# Patient Record
Sex: FEMALE | Race: WHITE | NOT HISPANIC OR LATINO | URBAN - METROPOLITAN AREA
[De-identification: names, ages, dates, MRNs, and addresses within clinical notes are randomized per-mention and may not be internally consistent; named-entity substitution may affect disease eponyms.]

---

## 2017-06-07 ENCOUNTER — APPOINTMENT (RX ONLY)
Dept: RURAL CLINIC 4 | Facility: CLINIC | Age: 62
Setting detail: DERMATOLOGY
End: 2017-06-07

## 2017-06-07 DIAGNOSIS — L57.0 ACTINIC KERATOSIS: ICD-10-CM

## 2017-06-07 DIAGNOSIS — L57.8 OTHER SKIN CHANGES DUE TO CHRONIC EXPOSURE TO NONIONIZING RADIATION: ICD-10-CM

## 2017-06-07 DIAGNOSIS — D22 MELANOCYTIC NEVI: ICD-10-CM

## 2017-06-07 DIAGNOSIS — L82.1 OTHER SEBORRHEIC KERATOSIS: ICD-10-CM

## 2017-06-07 DIAGNOSIS — D18.0 HEMANGIOMA: ICD-10-CM

## 2017-06-07 PROBLEM — D22.5 MELANOCYTIC NEVI OF TRUNK: Status: ACTIVE | Noted: 2017-06-07

## 2017-06-07 PROBLEM — D18.01 HEMANGIOMA OF SKIN AND SUBCUTANEOUS TISSUE: Status: ACTIVE | Noted: 2017-06-07

## 2017-06-07 PROCEDURE — ? COUNSELING

## 2017-06-07 PROCEDURE — 17003 DESTRUCT PREMALG LES 2-14: CPT

## 2017-06-07 PROCEDURE — 17000 DESTRUCT PREMALG LESION: CPT

## 2017-06-07 PROCEDURE — ? LIQUID NITROGEN

## 2017-06-07 PROCEDURE — 99214 OFFICE O/P EST MOD 30 MIN: CPT | Mod: 25

## 2017-06-07 PROCEDURE — ? PRESCRIPTION

## 2017-06-07 PROCEDURE — ? TREATMENT REGIMEN

## 2017-06-07 RX ORDER — TRETINOIN 0.25 MG/G
CREAM TOPICAL
Qty: 1 | Refills: 6 | Status: ERX | COMMUNITY
Start: 2017-06-07

## 2017-06-07 RX ADMIN — TRETINOIN TUBE: 0.25 CREAM TOPICAL at 00:00

## 2017-06-07 ASSESSMENT — LOCATION DETAILED DESCRIPTION DERM
LOCATION DETAILED: LEFT LATERAL ABDOMEN
LOCATION DETAILED: RIGHT ORAL COMMISSURE
LOCATION DETAILED: EPIGASTRIC SKIN
LOCATION DETAILED: RIGHT INFERIOR MEDIAL FOREHEAD
LOCATION DETAILED: LEFT ORAL COMMISSURE
LOCATION DETAILED: PERIUMBILICAL SKIN

## 2017-06-07 ASSESSMENT — LOCATION ZONE DERM
LOCATION ZONE: LIP
LOCATION ZONE: TRUNK
LOCATION ZONE: FACE

## 2017-06-07 ASSESSMENT — LOCATION SIMPLE DESCRIPTION DERM
LOCATION SIMPLE: LEFT LIP
LOCATION SIMPLE: RIGHT FOREHEAD
LOCATION SIMPLE: RIGHT LIP
LOCATION SIMPLE: ABDOMEN

## 2017-08-23 NOTE — PROCEDURE: MIPS QUALITY
Quality 111:Pneumonia Vaccination Status For Older Adults: Pneumococcal Vaccination Previously Received
Quality 110: Preventive Care And Screening: Influenza Immunization: Influenza Immunization previously received during influenza season
Detail Level: Detailed
Quality 226: Preventive Care And Screening: Tobacco Use: Screening And Cessation Intervention: Patient screened for tobacco and never smoked
Detail Level: Detailed
Quality 131: Pain Assessment And Follow-Up: Pain assessment using a standardized tool is documented as negative, no follow-up plan required
Quality 47: Advance Care Plan: Advance Care Planning discussed and documented in the medical record; patient did not wish or was not able to name a surrogate decision maker or provide an advance care plan.
Quality 130: Documentation Of Current Medications In The Medical Record: Current Medications Documented

## 2018-02-21 ENCOUNTER — APPOINTMENT (RX ONLY)
Dept: RURAL CLINIC 4 | Facility: CLINIC | Age: 63
Setting detail: DERMATOLOGY
End: 2018-02-21

## 2018-02-21 DIAGNOSIS — D22 MELANOCYTIC NEVI: ICD-10-CM

## 2018-02-21 DIAGNOSIS — L57.0 ACTINIC KERATOSIS: ICD-10-CM

## 2018-02-21 DIAGNOSIS — L57.8 OTHER SKIN CHANGES DUE TO CHRONIC EXPOSURE TO NONIONIZING RADIATION: ICD-10-CM

## 2018-02-21 DIAGNOSIS — L82.1 OTHER SEBORRHEIC KERATOSIS: ICD-10-CM

## 2018-02-21 DIAGNOSIS — D18.0 HEMANGIOMA: ICD-10-CM

## 2018-02-21 PROBLEM — D22.5 MELANOCYTIC NEVI OF TRUNK: Status: ACTIVE | Noted: 2018-02-21

## 2018-02-21 PROBLEM — D18.01 HEMANGIOMA OF SKIN AND SUBCUTANEOUS TISSUE: Status: ACTIVE | Noted: 2018-02-21

## 2018-02-21 PROCEDURE — ? LIQUID NITROGEN

## 2018-02-21 PROCEDURE — ? COUNSELING

## 2018-02-21 PROCEDURE — 99214 OFFICE O/P EST MOD 30 MIN: CPT | Mod: 25

## 2018-02-21 PROCEDURE — 17000 DESTRUCT PREMALG LESION: CPT

## 2018-02-21 ASSESSMENT — LOCATION DETAILED DESCRIPTION DERM
LOCATION DETAILED: EPIGASTRIC SKIN
LOCATION DETAILED: RIGHT LATERAL SUPERIOR CHEST
LOCATION DETAILED: RIGHT INFERIOR MEDIAL FOREHEAD
LOCATION DETAILED: LEFT LATERAL ABDOMEN
LOCATION DETAILED: LEFT INFERIOR CENTRAL MALAR CHEEK
LOCATION DETAILED: PERIUMBILICAL SKIN
LOCATION DETAILED: RIGHT MEDIAL SUPERIOR CHEST

## 2018-02-21 ASSESSMENT — LOCATION SIMPLE DESCRIPTION DERM
LOCATION SIMPLE: RIGHT FOREHEAD
LOCATION SIMPLE: ABDOMEN
LOCATION SIMPLE: LEFT CHEEK
LOCATION SIMPLE: CHEST
LOCATION SIMPLE: CHEST

## 2018-02-21 ASSESSMENT — LOCATION ZONE DERM
LOCATION ZONE: FACE
LOCATION ZONE: TRUNK
LOCATION ZONE: TRUNK

## 2018-02-21 NOTE — PROCEDURE: LIQUID NITROGEN
Number Of Freeze-Thaw Cycles: 2 freeze-thaw cycles
Consent: The patient's consent was obtained including but not limited to risks of crusting, scabbing, blistering, scarring, darker or lighter pigmentary change, recurrence, incomplete removal and infection.
Detail Level: Simple
Duration Of Freeze Thaw-Cycle (Seconds): 2
Render Post-Care Instructions In Note?: no
Post-Care Instructions: I reviewed with the patient in detail post-care instructions. Patient is to wear sunprotection, and avoid picking at any of the treated lesions. Pt may apply Vaseline to crusted or scabbing areas.
Duration Of Freeze Thaw-Cycle (Seconds): 0
Medical Necessity Information: It is in your best interest to select a reason for this procedure from the list below. All of these items fulfill various CMS LCD requirements except the new and changing color options.
Medical Necessity Clause: This procedure was medically necessary because the lesions that were treated were:
Detail Level: Detailed

## 2018-10-25 ENCOUNTER — APPOINTMENT (RX ONLY)
Dept: RURAL CLINIC 4 | Facility: CLINIC | Age: 63
Setting detail: DERMATOLOGY
End: 2018-10-25

## 2018-10-25 DIAGNOSIS — D22 MELANOCYTIC NEVI: ICD-10-CM

## 2018-10-25 DIAGNOSIS — D18.0 HEMANGIOMA: ICD-10-CM

## 2018-10-25 DIAGNOSIS — L82.1 OTHER SEBORRHEIC KERATOSIS: ICD-10-CM

## 2018-10-25 DIAGNOSIS — L57.8 OTHER SKIN CHANGES DUE TO CHRONIC EXPOSURE TO NONIONIZING RADIATION: ICD-10-CM

## 2018-10-25 PROBLEM — D18.01 HEMANGIOMA OF SKIN AND SUBCUTANEOUS TISSUE: Status: ACTIVE | Noted: 2018-10-25

## 2018-10-25 PROBLEM — D22.5 MELANOCYTIC NEVI OF TRUNK: Status: ACTIVE | Noted: 2018-10-25

## 2018-10-25 PROCEDURE — 99214 OFFICE O/P EST MOD 30 MIN: CPT

## 2018-10-25 PROCEDURE — ? COUNSELING

## 2018-10-25 ASSESSMENT — LOCATION DETAILED DESCRIPTION DERM
LOCATION DETAILED: EPIGASTRIC SKIN
LOCATION DETAILED: RIGHT INFERIOR MEDIAL FOREHEAD
LOCATION DETAILED: LEFT LATERAL ABDOMEN
LOCATION DETAILED: PERIUMBILICAL SKIN

## 2018-10-25 ASSESSMENT — LOCATION ZONE DERM
LOCATION ZONE: FACE
LOCATION ZONE: TRUNK

## 2018-10-25 ASSESSMENT — LOCATION SIMPLE DESCRIPTION DERM
LOCATION SIMPLE: RIGHT FOREHEAD
LOCATION SIMPLE: ABDOMEN

## 2019-04-03 ENCOUNTER — NEW PATIENT COMPREHENSIVE (OUTPATIENT)
Dept: URBAN - METROPOLITAN AREA CLINIC 43 | Facility: CLINIC | Age: 64
End: 2019-04-03

## 2019-04-03 DIAGNOSIS — H52.4: ICD-10-CM

## 2019-04-03 DIAGNOSIS — H52.03: ICD-10-CM

## 2019-04-03 DIAGNOSIS — H35.373: ICD-10-CM

## 2019-04-03 DIAGNOSIS — H25.093: ICD-10-CM

## 2019-04-03 DIAGNOSIS — H10.13: ICD-10-CM

## 2019-04-03 PROCEDURE — 92004 COMPRE OPH EXAM NEW PT 1/>: CPT

## 2019-04-03 PROCEDURE — 92015 DETERMINE REFRACTIVE STATE: CPT

## 2019-04-03 RX ORDER — NEOMYCIN SULFATE, POLYMYXIN B SULFATE AND DEXAMETHASONE 3.5; 10000; 1 MG/ML; [USP'U]/ML; MG/ML
1 SUSPENSION OPHTHALMIC
Start: 2019-04-03 | End: 2019-04-17

## 2019-04-03 ASSESSMENT — VISUAL ACUITY
OS_SC: J12
OD_CC: J1-
OS_SC: 20/70
OD_SC: 20/200
OD_SC: J14
OS_CC: J1
OD_CC: 20/30+1
OS_CC: 20/30-2

## 2019-04-03 ASSESSMENT — TONOMETRY
OD_IOP_MMHG: 15
OS_IOP_MMHG: 15

## 2019-04-25 ENCOUNTER — APPOINTMENT (RX ONLY)
Dept: RURAL CLINIC 4 | Facility: CLINIC | Age: 64
Setting detail: DERMATOLOGY
End: 2019-04-25

## 2019-04-25 DIAGNOSIS — L57.0 ACTINIC KERATOSIS: ICD-10-CM

## 2019-04-25 DIAGNOSIS — L82.1 OTHER SEBORRHEIC KERATOSIS: ICD-10-CM

## 2019-04-25 DIAGNOSIS — L57.8 OTHER SKIN CHANGES DUE TO CHRONIC EXPOSURE TO NONIONIZING RADIATION: ICD-10-CM

## 2019-04-25 DIAGNOSIS — D22 MELANOCYTIC NEVI: ICD-10-CM

## 2019-04-25 DIAGNOSIS — D18.0 HEMANGIOMA: ICD-10-CM

## 2019-04-25 PROBLEM — D18.01 HEMANGIOMA OF SKIN AND SUBCUTANEOUS TISSUE: Status: ACTIVE | Noted: 2019-04-25

## 2019-04-25 PROBLEM — D22.5 MELANOCYTIC NEVI OF TRUNK: Status: ACTIVE | Noted: 2019-04-25

## 2019-04-25 PROCEDURE — 17000 DESTRUCT PREMALG LESION: CPT

## 2019-04-25 PROCEDURE — ? COUNSELING

## 2019-04-25 PROCEDURE — 99214 OFFICE O/P EST MOD 30 MIN: CPT | Mod: 25

## 2019-04-25 PROCEDURE — ? LIQUID NITROGEN

## 2019-04-25 ASSESSMENT — LOCATION DETAILED DESCRIPTION DERM
LOCATION DETAILED: EPIGASTRIC SKIN
LOCATION DETAILED: LEFT INFERIOR CENTRAL MALAR CHEEK
LOCATION DETAILED: PERIUMBILICAL SKIN

## 2019-04-25 ASSESSMENT — LOCATION SIMPLE DESCRIPTION DERM
LOCATION SIMPLE: LEFT CHEEK
LOCATION SIMPLE: ABDOMEN

## 2019-04-25 ASSESSMENT — LOCATION ZONE DERM
LOCATION ZONE: TRUNK
LOCATION ZONE: FACE

## 2019-04-25 NOTE — PROCEDURE: LIQUID NITROGEN
Detail Level: Detailed
Duration Of Freeze Thaw-Cycle (Seconds): 2
Post-Care Instructions: I reviewed with the patient in detail post-care instructions. Patient is to wear sunprotection, and avoid picking at any of the treated lesions. Pt may apply Vaseline to crusted or scabbing areas.
Render Note In Bullet Format When Appropriate: No
Number Of Freeze-Thaw Cycles: 3 freeze-thaw cycles
Consent: The patient's consent was obtained including but not limited to risks of crusting, scabbing, blistering, scarring, darker or lighter pigmentary change, recurrence, incomplete removal and infection.
Render Post-Care Instructions In Note?: yes

## 2019-04-25 NOTE — PROCEDURE: MIPS QUALITY
Detail Level: Detailed
Quality 474: Zoster Vaccination Status: Shingrix Vaccination not Administered or Previously Received, Reason not Otherwise Specified
Quality 130: Documentation Of Current Medications In The Medical Record: Current Medications Documented
Quality 131: Pain Assessment And Follow-Up: Pain assessment using a standardized tool is documented as negative, no follow-up plan required
Additional Notes: Patient states pain as negative, and on scale of 0-10 the pain level is 0.

## 2019-06-13 NOTE — PATIENT DISCUSSION
CATARACTS, OU - EQUALLY VISUALLY SIGNIFICANT . PATIENT PREFERS TO HOLD OFF ON SURGERY UNTIL AFTER SUMMER AND IS AWARE OF 90 DAY LIMIT FOR A DILATED EXAM PRIOR TO SURGERY. WHEN PATIENT IS READY SCHEDULE OD FIRST THEN OS.

## 2019-06-13 NOTE — PATIENT DISCUSSION
Surgery  Counseling: I have discussed the option of glasses versus cataract surgery versus following. It was explained that when vision no longer meets the patient's visual needs and a new prescription for glasses is not likely to improve the patient's visual symptoms, the option of cataract surgery is a reasonable next step. It was explained that there is no guarantee that removing the cataract will improve their visual symptoms, however; it is believed that the cataract is contributing to the patient's visual impairment and surgery may significantly improve both the visual and functional status of the patient. The risks, benefits and alternatives of surgery were discussed with the patient. After this discussion, the patient desires to proceed with cataract surgery with implantation of an intraocular lens to improve vision and reduce glare at night due to headlights and during the day due to sunlight.

## 2019-12-05 ENCOUNTER — APPOINTMENT (RX ONLY)
Dept: RURAL CLINIC 4 | Facility: CLINIC | Age: 64
Setting detail: DERMATOLOGY
End: 2019-12-05

## 2019-12-05 ENCOUNTER — RX ONLY (OUTPATIENT)
Age: 64
Setting detail: RX ONLY
End: 2019-12-05

## 2019-12-05 DIAGNOSIS — L82.1 OTHER SEBORRHEIC KERATOSIS: ICD-10-CM

## 2019-12-05 DIAGNOSIS — L57.8 OTHER SKIN CHANGES DUE TO CHRONIC EXPOSURE TO NONIONIZING RADIATION: ICD-10-CM

## 2019-12-05 DIAGNOSIS — D18.0 HEMANGIOMA: ICD-10-CM

## 2019-12-05 DIAGNOSIS — D22 MELANOCYTIC NEVI: ICD-10-CM

## 2019-12-05 DIAGNOSIS — Z87.2 PERSONAL HISTORY OF DISEASES OF THE SKIN AND SUBCUTANEOUS TISSUE: ICD-10-CM

## 2019-12-05 PROBLEM — D22.5 MELANOCYTIC NEVI OF TRUNK: Status: ACTIVE | Noted: 2019-12-05

## 2019-12-05 PROBLEM — D18.01 HEMANGIOMA OF SKIN AND SUBCUTANEOUS TISSUE: Status: ACTIVE | Noted: 2019-12-05

## 2019-12-05 PROCEDURE — 99214 OFFICE O/P EST MOD 30 MIN: CPT

## 2019-12-05 PROCEDURE — ? COUNSELING

## 2019-12-05 RX ORDER — TRETIONIN 0.25 MG/G
THIN LAYER CREAM TOPICAL QHS
Qty: 1 | Refills: 6

## 2019-12-05 ASSESSMENT — PAIN INTENSITY VAS: HOW INTENSE IS YOUR PAIN 0 BEING NO PAIN, 10 BEING THE MOST SEVERE PAIN POSSIBLE?: NO PAIN

## 2019-12-05 ASSESSMENT — LOCATION DETAILED DESCRIPTION DERM
LOCATION DETAILED: PERIUMBILICAL SKIN
LOCATION DETAILED: EPIGASTRIC SKIN
LOCATION DETAILED: LEFT LATERAL SUPERIOR CHEST

## 2019-12-05 ASSESSMENT — LOCATION SIMPLE DESCRIPTION DERM
LOCATION SIMPLE: ABDOMEN
LOCATION SIMPLE: CHEST

## 2019-12-05 ASSESSMENT — LOCATION ZONE DERM: LOCATION ZONE: TRUNK

## 2020-08-18 ENCOUNTER — ESTABLISHED COMPREHENSIVE EXAM (OUTPATIENT)
Dept: URBAN - METROPOLITAN AREA CLINIC 43 | Facility: CLINIC | Age: 65
End: 2020-08-18

## 2020-08-18 DIAGNOSIS — H52.03: ICD-10-CM

## 2020-08-18 DIAGNOSIS — H52.4: ICD-10-CM

## 2020-08-18 DIAGNOSIS — H25.093: ICD-10-CM

## 2020-08-18 PROCEDURE — 92014 COMPRE OPH EXAM EST PT 1/>: CPT

## 2020-08-18 PROCEDURE — 92015 DETERMINE REFRACTIVE STATE: CPT

## 2020-08-18 ASSESSMENT — VISUAL ACUITY
OD_CC: 20/30+2
OD_CC: J3
OS_SC: J12
OD_SC: <J12
OD_SC: 20/100-1
OS_SC: 20/50
OS_CC: J1
OS_CC: 20/30-1

## 2020-08-18 ASSESSMENT — TONOMETRY
OS_IOP_MMHG: 15
OD_IOP_MMHG: 15

## 2021-02-23 NOTE — PATIENT DISCUSSION
Surgery Counseling: I have discussed the option of glasses versus cataract surgery versus following. It was explained that when the patients vision no longer meets their visual needs and a glasses prescription does not improve visual symptoms, the option of cataract surgery is a reasonable next step. It was explained that there is no guarantee that removing the cataract will improve their visual symptoms, however; it is believed that the cataract is contributing to the patient's visual impairment and surgery may improve both the visual and functional status of the patient. The risks, benefits and alternatives of surgery were discussed with the patient. After this discussion, the patient desires to proceed with cataract surgery with implantation of an intraocular lens to improve vision for glare at night when driving.

## 2021-02-23 NOTE — PATIENT DISCUSSION
OPTION 1: CUSTOM VISION. PT TO WEAR GLASSES FOR INTERMEDIATE AND NEAR AND HAVE BEST CORRECTED DISTANCE VISION.

## 2021-02-23 NOTE — PATIENT DISCUSSION
CATARACT, OU - EQUALLY VISUALLY SIGNIFICANT . SCHEDULE SX OD (DOMINATE EYE) THEN LATER IN OS IF VISUAL SYMPTOMS PERSIST.  GLS RX GIVEN TO FILL IF DESIRES IN THE EVENT PT DOES NOT PROCEED W/ SX.

## 2021-02-23 NOTE — PATIENT DISCUSSION
OPTION 2: PANOPTIX IOL DISCUSSED WITH THE PATIENT. THE PATIENT UNDERSTANDS THAT THE COMPROMISES AND VISUAL SYMPTOMS FROM PATIENT TO PATIENT IS VARIABLE. IT IS IMPOSSIBLE TO PREDICT THE OUTCOME OF THE LENS BEING OFFERED. PANOPTIX TRIFOCAL IOL IS DESIGNED TO DECREASE DEPENDENCY ON GLASSES FOR NEAR, INTERMEDIATE AND DISTANCE. COMPROMISES INCLUDE DECREASED QUALITY OF VISION, DEPTH OF FOCUS AND CONTRAST SENSITIVITY. VISUAL DISTURBANCES INCLUDE STARBURSTS, RINGS AND HALOS USUALLY NOTICED AROUND POINT SOURCES OF LIGHT AT NIGHT.

## 2021-02-23 NOTE — PATIENT DISCUSSION
***The patient is interested in refractive cataract surgery. After discussing all options for becoming less dependent on glasses after surgery, the patient is considering distance vision with astigmatism correction if needed. The anticipated visual outcome is satisfactory distance. The patient is aware they will depend on glasses for all near/intermediate vision. ***

## 2021-03-10 NOTE — PATIENT DISCUSSION
Surgery  Counseling: I have discussed the option of glasses versus cataract surgery versus following . It was explained that when vision no longer meets the patient's visual needs and a new prescription for glasses is not likely to improve all of the patient's visual symptoms, the option of cataract surgery is a reasonable next step. It was explained that there is no guarantee that removing the cataract will improve their visual symptoms, however; it is believed that the cataract is contributing to the patient's visual impairment and surgery may significantly improve both the visual and functional status of the patient. The risks, benefits and alternatives of surgery were discussed with the patient. After this discussion, the patient desires to proceed with cataract surgery with implantation of an intraocular lens to improve vision for glare at night when driving.

## 2021-03-10 NOTE — PATIENT DISCUSSION
Continue: prednisol ace-gatiflox-bromfen (prednisol ace-gatiflox-bromfen): drops,suspension: 1-0.5-0.075% 1 drop three times a day as directed opht 02-

## 2021-03-17 NOTE — PATIENT DISCUSSION
***This patient had refractive cataract surgery performed. An IOL was placed to achieve a target refraction of -0.50 (which should provide them with satisfactory vision with the possible aid of glasses/contact lenses prn). ***

## 2021-04-13 NOTE — PATIENT DISCUSSION
S/P PCIOL, OU - PT STILL USING GTTS TID. INSTRUCTED PT TO TAPER GTTS BID X 1 WEEK THEN QD X 1 WEEK. CONSIDER OPTIONS FOR POSSIBLE RESIDUAL REFRACTIVE ERROR ONCE THE CORNEA IS STABLE, HE IS OFF OMNI AND MACULA CLEARED BY DR. Chele Waite.

## 2021-04-13 NOTE — PATIENT DISCUSSION
ERM, OS - POSSIBLY CONTRIBUTING TO DE BCVA OS. CONSULT DR. REEVES FOR FURTHER EVAL AND POSSIBLE TREATMENT.

## 2021-04-27 NOTE — PATIENT DISCUSSION
MILD RESIDUAL REFRACTIVE ERROR POST PC IOL - DISC OPT OF LASIK/PRK TO FINE TUNE VISUAL OUTCOME. RECOMMEND CONTACT LENS TRIAL WITH DR. Selena Pickard. TRIAL IF PATIENT WERE TO CORRECT HIS FULL PRESCRIPTION WITH LASIK/ORK WOULD HE STILL LIKE HIS NEAR AND COMPUTER VISION. PATIENT STATES 85% (15% COMPROMISED) VISION COMPARED TO HIS RIGHT EYE. PATIENT TRIAL FRAMED TODAY AND FELT HIS NEAR VISION WAS WORSE. IF PATIENT LIKES THE DISTANCE AND NEAR VISION WITH THE TRIAL CONTACT LENSES WILL DISCUSS ENHANCEMENT.

## 2021-04-27 NOTE — PATIENT DISCUSSION
ERM OS-PATIENT FOLLOWED BY DR. Michelle Canas.  PATIENT UNDERSTANDS THAT ALTHOUGH MILD THE ERM MAY LIMIT AND DISTORT HIS VISION EVEN IF HE WERE TO PROCEED WITH AN ENHANCEMENT

## 2021-05-04 NOTE — PATIENT DISCUSSION
DRY EYE, OU - CONTINUE TO USE ARTIFICIAL TEARS, DISCUSSED USING REWETTING DROP MADE FOR CL IN OS. FOLLOW.

## 2021-05-04 NOTE — PATIENT DISCUSSION
ERM, OS - PATIENT UNDERSTANDS MAY LIMIT VISION EVEN AFTER LASIK/PRK ENH. CONTINUE CARE WITH DR Sonali Betts AS DIRECTED.

## 2021-05-04 NOTE — PATIENT DISCUSSION
S/P PHACO WITH VIVITY OU - PATIENT IS UNHAPPY WITH DISTANCE VISION. OS IS HIS [place] EYE AND HE FEELS VISION IS NOT AS CLEAR AND BALANCED AS HE HOPED IT WOULD BE.  HE WAS HAPPY WITH THE TRIAL FRAME IN OFFICE TODAY. PLACED A -0.75 ACUVUE OASYS CL IN THE LEFT EYE SO PT COULD CONFIRM HE LIKES THIS IN THE REAL WORLD. CONSIDER LASIK/PRK ENHANCEMENT IF PT DESIRES, PATIENT MAY WANT TO TRY CONTACT LENS FIRST FOR FEW MONTHS DUE TO HIS SCHEDULE AND NOT SURE IF HE CAN DO ANOTHER PROCEDURE AT THIS TIME. RTCC 1 WEEK FOR CL REMOVAL, CALL STAT IF ANY REDNESS/PAIN/BLUR.

## 2021-05-11 NOTE — PATIENT DISCUSSION
RESIDUAL MYOPIA S/P VIVITY IOL OS - HE HAS BEEN HAPPY WITH -0.75 CONTACT LENS. SCHEDULE LASIK CONSULT WHEN HE IS READY. HE WOULD LIKE TO CONTINUE WEARING THE CONTACT LENS. REMOVED CONTACT LENS TODAY IN OFFICE.

## 2021-05-18 NOTE — PATIENT DISCUSSION
"RESIDUAL MYOPIA S/P VIVITY IOL, OS - SUCCESSFUL I/R TRAINING TODAY. DISCUSSED 2WK VS DAILY DISPOSABLE AND PATIENT PREFERS DAILY DISPOSABLE. FINALIZED CL RX TODAY. REPRINTED GLS RX TODAY AS WELL FOR DVO. PATIENT IS HAPPY WITH CURRENT COMPUTER AND NEAR VISION BUT CONSIDERING COMPUTER GLS IN FUTURE TO ""DIAL THAT IN"". "

## 2022-09-06 NOTE — PATIENT DISCUSSION
Posterior capsular opacity accounts for the patient's complaints and is interfering with activities of daily living. Discussed risks and benefits of YAG Laser Capsulotomy. Patient wishes to proceed.

## 2022-10-04 NOTE — PATIENT DISCUSSION
Posterior capsular opacity accounts for the patient's complaints and is interfering with activities of daily living. Discussed risks and benefits of YAG Laser Capsulotomy. Patient wishes to proceed. Schedule YAG Laser Capsulotomy in the left eye (patient feels OS is worse) then the right eye.
